# Patient Record
Sex: FEMALE | Race: WHITE | NOT HISPANIC OR LATINO | Employment: OTHER | URBAN - METROPOLITAN AREA
[De-identification: names, ages, dates, MRNs, and addresses within clinical notes are randomized per-mention and may not be internally consistent; named-entity substitution may affect disease eponyms.]

---

## 2021-05-04 NOTE — PROGRESS NOTES
New Patient    Patient: Shona Elizabeth  : 1980    Referring Physician:Oscar Schultz M.D.   No primary care provider on file.    CC: possible seizures      IMPRESSION:  1. Long standing history of episodes of behavioral arrest, with hands automatism, preceded by aura as fear, difficulty with speech.   Semiology is suggestive of focal seizure although association with non-epileptic events cannot be ruled-out (especialy lack of response to CBZ)  No previous seizure workup     2. History of bipolar disorder,  learning disability, auditory processing disorder     PLAN:    1. Spells of decreased attentiveness  - REFERRAL TO NEURODIAGNOSTICS (EEG,EP,EMG/NCS/DBS)  - MR-BRAIN-WITH & W/O; Future    2. Morbid obesity with BMI of 40.0-44.9, adult (HCC)  - MA-SCREENING MAMMO BILAT W/CAD; Future  - Patient identified as having weight management issue.  Appropriate orders and counseling given.    Other orders  - busPIRone (BUSPAR) 10 MG Tab tablet; 2 times a day.  - albuterol 108 (90 Base) MCG/ACT Aero Soln inhalation aerosol; Inhale 2 Puffs every 6 hours as needed for Shortness of Breath.    3. Hold off changing/initiating AED pending #1 given her previous poor reaction to medications, consider EMU in late August to if MRI and EEG normal    4. Advise to reduce triggering factors  5. Seizure precautions were discussed in detail with patient: long discussion regarding driving restriction,   I informed her of the State Law of [place] which requires that patients with an episode involving a condition with lapse of consciousness, blackout, seizure, fainting spell, syncope, or other impairment of the level of consciousness cannot drive for at least 3 months from the last episode.  She does have reliable aura that gives her enough time to pull over the car. She was encouraged to report this to the DMV and to be cleared by DMV.  6. Return in 6 months or sooner if needed.       HISTORY:  I had the opportunity to see Ms.   Shona Elizabeth in the epilepsy clinic on 2021 for seizure disorder. she was accompanied by her mother who provided additional history. This is her initial visit. she is referred by Dr. Oscar Schultz for possible seizure disorder.      Dominant hand: ambidextrous    Outside records and ED record reviewed: Yes.    In brief, her pertinent medical history is remarkable for long standing history of events of unclear etiology    Event type 1 - freezing episodes  The onset of event was mid-late 30s, the ictal behavior was stereotyped and described as  Extreme fear, trouble to get words out (can be hour in duration) behavioral arrest, hands automatism, unresponsiveness and cannot move.  No nausea but rare drooling.   Headache and sleepy sometime. Sometime she is fully aware but cannot speak and at times she is not aware. Duration a few minutes, frequency: from 2-3 per week to none in a few weeks. The last one was 1 week ago.    Event type 2- she has flush light on the right side, smoke in the air. Auditory hallucination. Infrequent, no LOC     Event type 3- she was packing suitcase, her suitcase became very small suddenly. Infrequent     The events  were isolated    Special features:  They were noted only during the wake but more at evening/ night (when she is awake), there was no specific timing.     Potential triggering factors were reviewed   Sleep deprivation   Alcohol   Stress   Periods   Other    Epilepsy risk factors:     -Positive: learning disability, auditory processing disorder   -Negative: Normal prenatal and  course. Normal development physically and intellectually. No febrile convulsions. No history of severe head trauma. No meningitis. No stroke. No alcohol abuse. No significant exposure to recreational drugs. There is no family history of epilepsy, or spells. She graduated from university.     Current antiepileptic medicines: none       Previous workup:    1. EEG data: n/a    2. Neuroimaging data:  N/a      3. Recent AED level:    Prior antiepileptic medications were reviewed(she was diagnosed with bipolar disorder in the past)  CBZ weight gain, nausea and rash (was on 2017, she did not notice any improvement)   LTG memory lapse, rash, clumsiness, hallucination (from 2012 to 2014)      Antiepileptic medications most useful:    Other therapeutic interventions:   Vagus nerve stimulation   Diet   Surgery for epilepsy   Other    Current Outpatient Medications   Medication Sig Dispense Refill   • busPIRone (BUSPAR) 10 MG Tab tablet 2 times a day.     • albuterol 108 (90 Base) MCG/ACT Aero Soln inhalation aerosol Inhale 2 Puffs every 6 hours as needed for Shortness of Breath.       No current facility-administered medications for this visit.        Allergies   Allergen Reactions   • Aspirin    • Coffea Arabica        Past Medical History:   Diagnosis Date   • Anxiety    • Asthma        Social History     Socioeconomic History   • Marital status: Not on file     Spouse name: Not on file   • Number of children: Not on file   • Years of education: Not on file   • Highest education level: Not on file   Occupational History   • Not on file   Tobacco Use   • Smoking status: Never Smoker   • Smokeless tobacco: Never Used   Substance and Sexual Activity   • Alcohol use: Not Currently   • Drug use: Never   • Sexual activity: Not on file   Other Topics Concern   • Not on file   Social History Narrative   • Not on file     Social Determinants of Health     Financial Resource Strain:    • Difficulty of Paying Living Expenses:    Food Insecurity:    • Worried About Running Out of Food in the Last Year:    • Ran Out of Food in the Last Year:    Transportation Needs:    • Lack of Transportation (Medical):    • Lack of Transportation (Non-Medical):    Physical Activity:    • Days of Exercise per Week:    • Minutes of Exercise per Session:    Stress:    • Feeling of Stress :    Social Connections:    • Frequency of Communication  "with Friends and Family:    • Frequency of Social Gatherings with Friends and Family:    • Attends [employer] Services:    • Active Member of Clubs or Organizations:    • Attends Club or Organization Meetings:    • Marital Status:    Intimate Partner Violence:    • Fear of Current or Ex-Partner:    • Emotionally Abused:    • Physically Abused:    • Sexually Abused:        No family history on file.      PHYSICAL EXAM:    /75 (BP Location: Left arm, Patient Position: Sitting, BP Cuff Size: Adult)   Pulse 100   Temp 36.7 °C (98 °F) (Temporal)   Resp 16   Ht 1.676 m (5' 6\")   Wt 118 kg (261 lb)   SpO2 98%   BMI 42.13 kg/m²     On examination,  She appears her stated age. She has a normal, reactive affect. No apparent distress,  alert and appropriate. No labored breathing.  There is no peripheral edema. Skin: no rash or abnormalities    She gives a precise account of  her clinical symptoms. She has fluent conversational speech, without error.    Visual fields are full to confrontation.  Pupils are equal, round, and reactive to light.  Extraocular movements are conjugate, full, and without nystagmus Her face is symmetric.     There is normal muscle bulk and tone in all four extremities.  No drift, or satelliting of the upper extremities.  Normal amplitude rapid alternating movements using the digits of either hand.  I see no tremor.     Gait is normal.         The total visit duration was of 80 minutes of which more than 50% was spent in coordination of care and counseling.         Carlos Camacho MD  Diplomate, American Board of Psychiatry and Neurology   Diplomate, American Board of Psychiatry and Neurology in Epilepsy        "

## 2021-05-07 ENCOUNTER — OFFICE VISIT (OUTPATIENT)
Dept: NEUROLOGY | Facility: MEDICAL CENTER | Age: 41
End: 2021-05-07
Attending: PSYCHIATRY & NEUROLOGY
Payer: MEDICAID

## 2021-05-07 VITALS
BODY MASS INDEX: 41.95 KG/M2 | TEMPERATURE: 98 F | SYSTOLIC BLOOD PRESSURE: 112 MMHG | WEIGHT: 261 LBS | HEART RATE: 100 BPM | OXYGEN SATURATION: 98 % | RESPIRATION RATE: 16 BRPM | DIASTOLIC BLOOD PRESSURE: 75 MMHG | HEIGHT: 66 IN

## 2021-05-07 DIAGNOSIS — E66.01 MORBID OBESITY WITH BMI OF 40.0-44.9, ADULT (HCC): Primary | ICD-10-CM

## 2021-05-07 DIAGNOSIS — R68.89 SPELLS OF DECREASED ATTENTIVENESS: ICD-10-CM

## 2021-05-07 PROBLEM — R46.89 SPELL OF BEHAVIOR CHANGE: Status: ACTIVE | Noted: 2021-05-07

## 2021-05-07 PROCEDURE — 99211 OFF/OP EST MAY X REQ PHY/QHP: CPT | Performed by: PSYCHIATRY & NEUROLOGY

## 2021-05-07 PROCEDURE — 99205 OFFICE O/P NEW HI 60 MIN: CPT | Performed by: PSYCHIATRY & NEUROLOGY

## 2021-05-07 RX ORDER — ALBUTEROL SULFATE 90 UG/1
2 AEROSOL, METERED RESPIRATORY (INHALATION) EVERY 6 HOURS PRN
COMMUNITY

## 2021-05-07 RX ORDER — BUSPIRONE HYDROCHLORIDE 10 MG/1
TABLET ORAL 2 TIMES DAILY
COMMUNITY
Start: 2021-04-12

## 2021-05-07 ASSESSMENT — PATIENT HEALTH QUESTIONNAIRE - PHQ9: CLINICAL INTERPRETATION OF PHQ2 SCORE: 0

## 2021-05-07 NOTE — PATIENT INSTRUCTIONS
Seizure precautions    Driving    Every State restricts driving in people with seizures. [place] requires that you be seizure free for 3 months from the date of your last seizure. The Department of Motor Vehicles (DMV), not the doctor, makes the decision on driving. Exceptions may be made for seizures that do not affect mental condition and ability to control a car, or that occur 100% during sleep.We recommend:  § Do not drive if you are having seizures that would be dangerous on the road.  § Be honest with your doctor about your seizures, even if driving may be at risk  § Be honest with the DMV (use the driving form). It may protect you legally if problems later occur.    Seizure medicines and suicide    Seizure medicines have long been known to help some people with depression, but also to make others worse. The FDA recently took a look at their database of clinical studies of people taking epilepsy medicines. Their finding was 4 suicides in 27,863 patients taking epilepsy medicines, versus none in patients taking placebo (an inactive pill). They reported 105 people of the 27,863 who did not commit suicide, but had thoughts of suicide. Combined, the risk for suicidal thoughts and behavior was about 0.4% (1 in 250) for those taking epilepsy medications and 0.2% (1 in 500) for those given placebos.    This is new and important information because doctors and patients need to know the possible side effects of medicines. But it needs to be put in perspective and certainly is no reason for panic. The 4 suicides in 27,863 is a very small percentage, and it is impossible to be sure the epilepsy drugs were the cause. Depression occurs in about 10% or more of people with epilepsy, even independent of medications. We recommend the following.  § Do not stop your seizure medicine. It could be dangerous.  § If you have symptoms of depression, such as crying and low mood, please discuss them at your clinic visits, so a decision  can be made about whether antidepressant medication or referral to a psychotherapist would be useful.  § If you are thinking seriously about suicide, please call 911.  § For most people, this FDA warning is just something to know, but not a reason to change medicines.    Bone health    Several of the older antiseizure medications may cause thinning of bones with long-term use, leading to broken bones later in life. This is most problematic with phenytoin (Dilantin), but may occur with carbamazepine (Tegretol, Carbatrol), phenobarbital, primidone (Mysoline) and possibly valproate/valproic acid (Depakote, Depakene). This is a larger concern for women in mid-life or older, but it also can be an issue for men and younger women.   § This potential problem is not an emergency and occurs over months to years; do not stop your seizure medication without discussing your concerns with your doctor.   § Calcium, vitamin D3 supplementation and regular exercise may be helpful to maintain bone health.  § Periodic bone density screening may be helpful to show existence or progression of bone thinning.     Water Safety    You could drown during a seizure that occurs in water. Use the [employer] system for swimming. Let the sona know that you have seizures. Take showers instead of baths.    Burn safety    If you have uncontrolled seizures, be very careful around heat or flames. Cook on the back burner - you are less likely to lean on the burner or turn over the soup during a seizure. Don’t smoke, which is good advice for other reasons as well. Set the maximum house hot water temperature to 110 degrees Fahrenheit. Put guards on open fireplaces, wood stoves or radiators.     Heights    Occasional use of ladders and going up and down stairs is a reasonable risk. If your seizures are not in control, then do not work on ladders or unprotected heights for more than brief minutes.    Equipment and Power Tools    Cutting, chopping and drilling  equipment should have safety guards to avoid inadvertent injury; otherwise, do not use it if your seizures are not fully controlled. Do not use mowers lacking automatic stop switches or chain saws.     Safety    If you have uncontrolled seizures, do not carry your child in your arms, but use one of the slings/papooses. Change the baby on the floor. Do not bathe the baby in water deep enough for the mouth to be underwater. Breastfeeding is usually considered beneficial, even though small amounts of seizure medicines come out in the breast milk.    Fall Precautions      If you fall with some of your seizures, then fall-proof your environment. Put in carpets, cover sharp corners, and consider wearing a protective helmet in some circumstances.    Sudden Unexplained Death in Epilepsy (SUDEP)    It is rare for people to die from a seizure, but it can happen. One way is trauma or a car crash from a seizure. Another is the poorly understood condition called sudden unexplained death in epilepsy (SUDEP). We think this is most likely due to heart arrhythmias (irregular beats) caused by a seizure, but the mechanism is debated. For people with uncontrolled seizures we recommend:  § Do not suddenly stop your seizure medication, since this can be a risk factor for SUDEP.  § Do not be overly worried about SUDEP. It is tragic when it happens, but it is uncommon and there are currently no preventive measures, other than the best possible seizure control.    Medication Side Effects    To be approved for prescription use, seizure medicines must pass strict safety testing. Nevertheless, they all have side effects, some of which are potentially serious or even lethal. The risks of medications must be balanced against the risks of seizures. A full discussion of possible medicine side effects is not possible here, but we recommend:  § Know the main side effects of your seizure medicines. Your doctor is the best source for  individual information. Web sites such as epilepsyfoundation.org and epilepsy.com have good information.  § The package insert provided with your prescription lists full information on side effects, but most of these will never occur in an individual. Let the package insert inform you, but not scare you. Be aware that some side effects occur from drug interactions among all your medications. Interactions can involve prescription medicines, over-the-counter medicines, herbal remedies and even some foods. Grapefruit juice is an example of a seemingly benign food that can raise levels of carbamazepine or other drugs.  § Generic medications are less expensive, but may not produce the same blood levels as do brand name drugs or even other generics. Insurance plans and pharmacies sometimes switch to generics without patient or doctor approval. Be cautious if you are switching or being switched to generics. It may work out fine (and it often is a lot less expensive), but a blood test to check levels might be useful.    Recreation    If having a seizure during a recreational activity would cause you significant harm, then do not do the activity. Use common sense. Confer with your medical team for individual restrictions. As a general guideline for starting discussion with your medical team, we recommend:  § Low-risk recreation can be done by people with seizures, even if not in control. These include walking, running, bowling, golf, baseball, basketball, soccer, volleyball, swimming with the [employer] system, weight training with machines or spotters, elliptical trainers, treadmills with spotters. Confirm this with your medical care team.  § You should be able to go at least 3 months without a seizure to participate in medium-risk activities, but confirm this interval with your doctor. These include football, hockey, ice skating, bike racing, gymnastics, horseback riding and boating.  § You should be seizure free for more than  a year to perform high-risk activities, although some doctors recommend not engaging in high-risk recreational activities at all with a history of epilepsy. Ask yours if it is safe to engage in high-risk recreation. High-risk activities include hang gliding, motor sports, skiing, competitive skateboarding, mountain or rock climbing and SCUBA diving.

## 2021-07-14 ENCOUNTER — HOSPITAL ENCOUNTER (OUTPATIENT)
Dept: RADIOLOGY | Facility: MEDICAL CENTER | Age: 41
End: 2021-07-14
Attending: PSYCHIATRY & NEUROLOGY
Payer: MEDICAID

## 2021-07-14 ENCOUNTER — NON-PROVIDER VISIT (OUTPATIENT)
Dept: NEUROLOGY | Facility: MEDICAL CENTER | Age: 41
End: 2021-07-14
Attending: PSYCHIATRY & NEUROLOGY
Payer: MEDICAID

## 2021-07-14 DIAGNOSIS — R68.89 SPELLS OF DECREASED ATTENTIVENESS: ICD-10-CM

## 2021-07-14 PROCEDURE — 95816 EEG AWAKE AND DROWSY: CPT | Mod: 26 | Performed by: PSYCHIATRY & NEUROLOGY

## 2021-07-14 PROCEDURE — 95816 EEG AWAKE AND DROWSY: CPT | Performed by: PSYCHIATRY & NEUROLOGY

## 2021-07-14 PROCEDURE — A9576 INJ PROHANCE MULTIPACK: HCPCS | Performed by: PSYCHIATRY & NEUROLOGY

## 2021-07-14 PROCEDURE — 70553 MRI BRAIN STEM W/O & W/DYE: CPT

## 2021-07-14 PROCEDURE — 700117 HCHG RX CONTRAST REV CODE 255: Performed by: PSYCHIATRY & NEUROLOGY

## 2021-07-14 RX ADMIN — GADOTERIDOL 20 ML: 279.3 INJECTION, SOLUTION INTRAVENOUS at 08:30

## 2021-07-14 NOTE — PROCEDURES
VIDEO ELECTROENCEPHALOGRAM REPORT      Referring provider: Dr. Camacho    DOS: 07/14/21 (total recording of 27 minutes).     INDICATION:  Shona Elizabeth 41 y.o. adult presenting with episodes of behavioral arrest.     CURRENT ANTIEPILEPTIC REGIMEN: none     TECHNIQUE: 30 channel video electroencephalogram (EEG) was performed in accordance with the international 10-20 system. The study was reviewed in bipolar and referential montages. The recording examined the patient during   awake and drowsy states    DESCRIPTION OF THE RECORD:  During the wakefulness, the background showed a symmetrical 11Hz alpha activity posteriorly with amplitude of 70 mV.  There was reactivity to eye closure/opening.  A normal anterior-posterior gradient was noted with faster beta frequencies seen anteriorly.  During drowsiness, theta/delta frequencies were seen.  No sleep attained.     ACTIVATION PROCEDURES:     Hyperventilation was performed by the patient for a total of 3 minutes. The technician performing the test noted good effort. No physiological build up seen.     Intermittent Photic stimulation was performed in a stepwise fashion from 1 to 30 Hz and elicited a normal response (photic driving), most noticeable in the posterior leads.    ICTAL AND/OR INTERICTAL FINDINGS:   No focal or generalized epileptiform activity noted. No regional slowing was seen during this routine study.  No clinical events or seizures were reported or recorded during the study.     EKG: sampling of the EKG recording demonstrated sinus rhythm.     EVENTS:none     INTERPRETATION:    This is a  normal video EEG recording in the awake and drowsy states.   Note: A normal EEG does not rule out epilepsy.  If the clinical suspicion remains high for seizures, a prolonged recording to capture clinical or subclinical events may be helpful.    Carlos Camacho MD  Diplomate in Neurology&Epilepsy  Office: [phone]  Fax: [phone]

## 2021-07-20 ENCOUNTER — APPOINTMENT (OUTPATIENT)
Dept: RADIOLOGY | Facility: MEDICAL CENTER | Age: 41
End: 2021-07-20
Attending: EMERGENCY MEDICINE
Payer: MEDICAID

## 2021-07-20 ENCOUNTER — HOSPITAL ENCOUNTER (EMERGENCY)
Facility: MEDICAL CENTER | Age: 41
End: 2021-07-20
Attending: EMERGENCY MEDICINE
Payer: MEDICAID

## 2021-07-20 VITALS
RESPIRATION RATE: 18 BRPM | WEIGHT: 250 LBS | HEIGHT: 66 IN | TEMPERATURE: 98 F | DIASTOLIC BLOOD PRESSURE: 78 MMHG | BODY MASS INDEX: 40.18 KG/M2 | HEART RATE: 81 BPM | SYSTOLIC BLOOD PRESSURE: 140 MMHG | OXYGEN SATURATION: 96 %

## 2021-07-20 DIAGNOSIS — F41.0 PANIC ATTACK: ICD-10-CM

## 2021-07-20 DIAGNOSIS — R07.89 OTHER CHEST PAIN: ICD-10-CM

## 2021-07-20 LAB — EKG IMPRESSION: NORMAL

## 2021-07-20 PROCEDURE — 93005 ELECTROCARDIOGRAM TRACING: CPT | Performed by: EMERGENCY MEDICINE

## 2021-07-20 PROCEDURE — 71045 X-RAY EXAM CHEST 1 VIEW: CPT

## 2021-07-20 PROCEDURE — 700102 HCHG RX REV CODE 250 W/ 637 OVERRIDE(OP): Performed by: EMERGENCY MEDICINE

## 2021-07-20 PROCEDURE — A9270 NON-COVERED ITEM OR SERVICE: HCPCS | Performed by: EMERGENCY MEDICINE

## 2021-07-20 PROCEDURE — 99285 EMERGENCY DEPT VISIT HI MDM: CPT

## 2021-07-20 RX ORDER — DIAZEPAM 5 MG/1
10 TABLET ORAL EVERY 6 HOURS PRN
Status: DISCONTINUED | OUTPATIENT
Start: 2021-07-20 | End: 2021-07-21 | Stop reason: HOSPADM

## 2021-07-20 RX ADMIN — DIAZEPAM 10 MG: 5 TABLET ORAL at 21:06

## 2021-07-21 ENCOUNTER — OFFICE VISIT (OUTPATIENT)
Dept: NEUROLOGY | Facility: MEDICAL CENTER | Age: 41
End: 2021-07-21
Attending: PSYCHIATRY & NEUROLOGY
Payer: MEDICAID

## 2021-07-21 VITALS
BODY MASS INDEX: 42.52 KG/M2 | HEART RATE: 75 BPM | WEIGHT: 264.55 LBS | OXYGEN SATURATION: 99 % | TEMPERATURE: 98.7 F | SYSTOLIC BLOOD PRESSURE: 118 MMHG | HEIGHT: 66 IN | DIASTOLIC BLOOD PRESSURE: 78 MMHG | RESPIRATION RATE: 20 BRPM

## 2021-07-21 DIAGNOSIS — R68.89 SPELLS OF DECREASED ATTENTIVENESS: Primary | ICD-10-CM

## 2021-07-21 PROCEDURE — 99214 OFFICE O/P EST MOD 30 MIN: CPT | Performed by: PSYCHIATRY & NEUROLOGY

## 2021-07-21 PROCEDURE — 99211 OFF/OP EST MAY X REQ PHY/QHP: CPT | Performed by: PSYCHIATRY & NEUROLOGY

## 2021-07-21 NOTE — DISCHARGE INSTRUCTIONS
Return for symptoms different from your typical panic attacks such as significant chest pain, significant shortness of breath or other unusual symptoms.  Follow-up with your doctor as needed.    You had a borderline or high normal blood pressure reading today.  This does not necessarily mean you have hypertension.  Please followup with your/a primary physician for comprehensive blood pressure evaluation and yearly fasting cholesterol assessment.  BP Readings from Last 3 Encounters:   07/20/21 (!) 168/102   05/07/21 112/75

## 2021-07-21 NOTE — ED PROVIDER NOTES
"ED Provider Note    CHIEF COMPLAINT  Chief Complaint   Patient presents with   • Panic Attack     Pt was a code assist upstairs. Per rapid response team report, pt became tachypneic and anxious. Pt was \"staring into space\". Pt reports chest pain.    • Chest Pain       HPI  Shona Elizabeth is a 41 y.o. 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  Her mother had a TAVR yesterday and may have been a stroke today and a rapid response team presented.  According to the sister this patient zoned out, had some chest tightness and felt dizzy.  This was like panic attacks that patient has had but more severe.  She says she infrequently has panic attacks but has had four this week according to the sister.  Denies history of coronary artery disease.  She saw a neurologist this week and had a normal MRI of the brain with and without contrast to evaluate these \"zoning out\" episodes.  She has also had an echo and a Holter monitor recently within normal limits.  Denies history of hypertension dyslipidemia diabetes or tobacco use.  There is a family history of coronary artery disease    REVIEW OF SYSTEMS  Pertinent positives include: Panic attack, decreased responsiveness, dizziness, forgetfulness.  Pertinent negatives include:shortness of breath.  10+ systems reviewed and negative.      PAST MEDICAL HISTORY  Past Medical History:   Diagnosis Date   • Anxiety    • Asthma        SOCIAL HISTORY  Social History     Tobacco Use   • Smoking status: Never Smoker   • Smokeless tobacco: Never Used   Vaping Use   • Vaping Use: Never used   Substance Use Topics   • Alcohol use: Not Currently   • Drug use: Never     Social History     Substance and Sexual Activity   Drug Use Never       SURGICAL HISTORY  Past Surgical History:   Procedure Laterality Date   • ABDOMINAL EXPLORATION         CURRENT MEDICATIONS  Current Facility-Administered Medications   Medication Dose Route Frequency Provider Last Rate " "Last Admin   • diazePAM (VALIUM) tablet 10 mg  10 mg Oral Q6HRS PRN Zelalem Medeiros M.D.         Current Outpatient Medications   Medication Sig Dispense Refill   • busPIRone (BUSPAR) 10 MG Tab tablet 2 times a day.     • albuterol 108 (90 Base) MCG/ACT Aero Soln inhalation aerosol Inhale 2 Puffs every 6 hours as needed for Shortness of Breath.         ALLERGIES  Allergies   Allergen Reactions   • Aspirin    • Coconut Flavor    • Coffea Arabica        PHYSICAL EXAM  VITAL SIGNS: BP (!) 168/102   Pulse 80   Temp 36.3 °C (97.4 °F) (Oral)   Resp (!) 21   Ht 1.676 m (5' 6\")   Wt 113 kg (250 lb)   SpO2 98%   BMI 40.35 kg/m²   Reviewed and hypertensive afebrile  Constitutional: Well developed, Well nourished elevated BMI.  HENT: Normocephalic, atraumatic, bilateral external ears normal, Wearing mask.   Eyes: PERRLA, conjunctiva pink, no scleral icterus.   Cardiovascular: Regular S1-S2 without murmur, rub, gallop.  No dependent edema or calf tenderness.  Respiratory: No rales, rhonchi, wheeze or cough.  Gastrointestinal: Soft, nontender, nondistended, no organomegaly.  Skin: No erythema, no rash.   Genitourinary:  No costovertebral angle tenderness.   Neurologic: Alert & oriented x 3, cranial nerves 2-12 intact by passive exam.  No focal deficit noted.  Psychiatric: Affect normal, Judgment normal, Mood normal.     DIFFERENTIAL DIAGNOSIS:  ***.    EKG  ***.    RADIOLOGY/PROCEDURES  DX-CHEST-PORTABLE (1 VIEW)   Final Result         1.  No acute cardiopulmonary disease.          LABORATORY:  No results found for this or any previous visit.    INTERVENTIONS:  Medications   diazePAM (VALIUM) tablet 10 mg (has no administration in time range)     Response:***.    COURSE & MEDICAL DECISION MAKING  ***.    This patient has borderline or elevated blood pressure as recorded above and was instructed to followup with primary physician for comprehensive blood pressure evaluation and yearly fasting cholesterol assessment according " to to CMS protocol.    PLAN:  New Prescriptions    No medications on file       ***  *** handout given  Return for ***    No follow-up provider specified.    CONDITION: ***.    FINAL IMPRESSION  No diagnosis found.      Electronically signed by: Zelalem Medeiros M.D., 7/20/2021 8:47 PM

## 2021-07-21 NOTE — ED TRIAGE NOTES
"Chief Complaint   Patient presents with   • Panic Attack     Pt was a code assist upstairs. Per rapid response team report, pt became tachypneic and anxious. Pt was \"staring into space\". Pt reports chest pain.      Pt to yellow 66 by WC. Pt in gown and hooked up to monitor. Sister at bedside. Chart up for ERP.  "

## 2021-07-21 NOTE — PROGRESS NOTES
Follow-up visit    Patient: Shona Elizabeth  : 1980    Referring Physician: Carlos Camacho M.D.   Oscar Schultz M.D.    CC: seizures    IMPRESSION:  1. Long standing history of episodes of behavioral arrest, with hands automatism, preceded by aura as fear, difficulty with speech.   Semiology is suggestive of focal seizure although association with non-epileptic events cannot be ruled-out (especialy lack of response to CBZ)  seizure workup negative so far      2. History of bipolar disorder,  learning disability, auditory processing disorder     3. Panic attack      PLAN:  1. Spells of decreased attentiveness  - REFERRAL TO NEURODIAGNOSTICS (EEG,EP,EMG/NCS/DBS)  EMU admission in  to further assess risk for seizure and to characterize typical events.   2. advise to reduce triggering factors, continue to follow up with psychologist and psychiatrist.   3. Hold off changing/initiating AED pending #1 given her previous poor reaction to medications.   4. Advise to reduce triggering factors  5. Seizure precautions were discussed in detail with patient: discussion regarding driving restriction if she has recurrent episodes of LOC  6. Return in 6 months or sooner if needed.         HISTORY:    I had the opportunity to see   Shona Elizabeth in the epilepsy clinic on 2021 for follow up on seizure disorder. Aubrie Elizabeth was accompanied by her sister who provided additional history Aubrie Elizabeth was last seen by myself 2021.     Dominant hand: ambidextrous    Outside records and ED record reviewed: Yes.     In brief, her pertinent medical history is remarkable for long standing history of events of unclear etiology     Event type 1 - freezing episodes  The onset of event was mid-late 30s, the ictal behavior was stereotyped and described as  Extreme fear, trouble to get words out (can be hour in duration) behavioral arrest, hands automatism, unresponsiveness and cannot move.  No nausea but rare  "drooling.   Headache and sleepy sometime. Sometime she is fully aware but cannot speak and at times she is not aware. Duration a few minutes, frequency: from 2-3 per week to none in a few weeks. The last one was 1 week ago.     Event type 2- she has flush light on the right side, smoke in the air. Auditory hallucination. Infrequent, no LOC      Event type 3- she was packing suitcase, her suitcase became very small suddenly. Infrequent      The events  were isolated     Special features:  They were noted only during the wake but more at evening/ night (when she is awake), there was no specific timing.      Potential triggering factors were reviewed              Sleep deprivation              Alcohol              Stress              Periods              Other     Epilepsy risk factors:      -Positive: learning disability, auditory processing disorder   -Negative: Normal prenatal and  course. Normal development physically and intellectually. No febrile convulsions. No history of severe head trauma. No meningitis. No stroke. No alcohol abuse. No significant exposure to recreational drugs. There is no family history of epilepsy, or spells. She graduated from university.        Interval history:     Since last visit, she has no recurrent \" freezing episode\" for the past a few months. Unfortunately, she has been having more severe panic attacks that were witnessed by her sister.  Panic attack like episodes since childhood, those are more severe now due to their mother's illness.   Last one yesterday and all had triggers that she was upset by something, she became anxious with heavy breathing, trouble breathing with slurred speech and stuttering, les vu feeling. sensitive to light and sounds.   She was coherent during the panic attack but does not have recollection   45 minutes to 1 hour duration   Loud sounds overwhlems her.     Her MRI and EEG were both normal.     Current AEDs:  None       Previous " workup:    1. EEG data: EEG normal     2. Neuroimaging data: MRI brain normal     3. Recent AED level:      Prior antiepileptic medications were reviewed(she was diagnosed with bipolar disorder in the past)  CBZ weight gain, nausea and rash (was on 2017, she did not notice any improvement)   LTG memory lapse, rash, clumsiness, hallucination (from 2012 to 2014)       Antiepileptic medications most useful:    Other therapeutic interventions:   Vagus nerve stimulation   Diet   Surgery for epilepsy   Other    I reviewed the previous medical history, surgical, family and social histories in the electronic medical record.   On review of systems, 10 of 14 systems are reviewed and found to be negative except as listed above.     Current Outpatient Medications   Medication Sig Dispense Refill   • busPIRone (BUSPAR) 10 MG Tab tablet 2 times a day.     • albuterol 108 (90 Base) MCG/ACT Aero Soln inhalation aerosol Inhale 2 Puffs every 6 hours as needed for Shortness of Breath.       No current facility-administered medications for this visit.        Allergies   Allergen Reactions   • Aspirin    • Coconut Flavor    • Coffea Arabica        Past Medical History:   Diagnosis Date   • Anxiety    • Asthma        Social History     Socioeconomic History   • Marital status: Single     Spouse name: Not on file   • Number of children: Not on file   • Years of education: Not on file   • Highest education level: Not on file   Occupational History   • Not on file   Tobacco Use   • Smoking status: Never Smoker   • Smokeless tobacco: Never Used   Vaping Use   • Vaping Use: Never used   Substance and Sexual Activity   • Alcohol use: Not Currently   • Drug use: Never   • Sexual activity: Not on file   Other Topics Concern   • Not on file   Social History Narrative   • Not on file     Social Determinants of Health     Financial Resource Strain:    • Difficulty of Paying Living Expenses:    Food Insecurity:    • Worried About Running Out of Food  "in the Last Year:    • Ran Out of Food in the Last Year:    Transportation Needs:    • Lack of Transportation (Medical):    • Lack of Transportation (Non-Medical):    Physical Activity:    • Days of Exercise per Week:    • Minutes of Exercise per Session:    Stress:    • Feeling of Stress :    Social Connections:    • Frequency of Communication with Friends and Family:    • Frequency of Social Gatherings with Friends and Family:    • Attends Holiness Services:    • Active Member of Clubs or Organizations:    • Attends Club or Organization Meetings:    • Marital Status:    Intimate Partner Violence:    • Fear of Current or Ex-Partner:    • Emotionally Abused:    • Physically Abused:    • Sexually Abused:        No family history on file.      PHYSICAL EXAM:      /78 (BP Location: Left arm, Patient Position: Sitting, BP Cuff Size: Adult)   Pulse 75   Temp 37.1 °C (98.7 °F) (Temporal)   Resp 20   Ht 1.676 m (5' 5.98\")   Wt 120 kg (264 lb 8.8 oz)   SpO2 99%   BMI 42.72 kg/m²     On examination,  She has a normal, reactive affect.No apparent distress,  alert and appropriate. No labored breathing.   There is no peripheral edema. Skin: no rash or abnormalities    She gives a precise account of clinical symptoms. She has fluent conversational speech, without error. No dysarthria.  facial movements intact. No UE drift. I see no tremor.    Gait is cher      The total visit duration was of 30 minutes of which more than 50% was spent in coordination of care and counseling.         Carlos Camacho MD  Diplomate, American Board of Psychiatry and Neurology   Diplomate, American Board of Psychiatry and Neurology in Epilepsy     "

## 2021-07-21 NOTE — ED NOTES
Discharge instructions discussed with pt, verbalizes understanding. Pt amb to lobby with steady gait. All belongings with pt upon leaving unit.

## 2021-07-21 NOTE — ED PROVIDER NOTES
"ED Provider Note    CHIEF COMPLAINT  Chief Complaint   Patient presents with   • Panic Attack     Pt was a code assist upstairs. Per rapid response team report, pt became tachypneic and anxious. Pt was \"staring into space\". Pt reports chest pain.    • Chest Pain       HPI  Shona Elizabeth is a 41 y.o. 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  Her mother had a TAVR yesterday and may have been a stroke today and a rapid response team presented.  According to the sister this patient zoned out, had some chest tightness felt dizzy.  This was like panic attacks that patient has had but more severe.  She says she infrequently has panic attacks but has had for this week according to the sister.  Denies history of coronary artery disease.  She saw a neurologist this week and had a normal MRI of the brain with and without contrast to evaluate these zoning out episodes.  She is also had an echo and a Holter monitor recently within normal.  Denies history of hypertension dyslipidemia diabetes or tobacco use.  There is a family history of coronary artery disease    REVIEW OF SYSTEMS  Pertinent positives include: Anxiety, panic attack, nonverbal, forgetful, chest discomfort.  Pertinent negatives include: Coronary artery disease, ongoing chest pain, leg swelling, calf pain, DVT or PE history.  10+ systems reviewed and negative.      PAST MEDICAL HISTORY  Past Medical History:   Diagnosis Date   • Anxiety    • Asthma        SOCIAL HISTORY  Social History     Tobacco Use   • Smoking status: Never Smoker   • Smokeless tobacco: Never Used   Vaping Use   • Vaping Use: Never used   Substance Use Topics   • Alcohol use: Not Currently   • Drug use: Never     Social History     Substance and Sexual Activity   Drug Use Never       SURGICAL HISTORY  Past Surgical History:   Procedure Laterality Date   • ABDOMINAL EXPLORATION         CURRENT MEDICATIONS    Current Outpatient Medications   Medication " "Sig Dispense Refill   • busPIRone (BUSPAR) 10 MG Tab tablet 2 times a day.     • albuterol 108 (90 Base) MCG/ACT Aero Soln inhalation aerosol Inhale 2 Puffs every 6 hours as needed for Shortness of Breath.         ALLERGIES  Allergies   Allergen Reactions   • Aspirin    • Coconut Flavor    • Coffea Arabica        PHYSICAL EXAM  VITAL SIGNS: BP (!) 168/102   Pulse 80   Temp 36.3 °C (97.4 °F) (Oral)   Resp (!) 21   Ht 1.676 m (5' 6\")   Wt 113 kg (250 lb)   SpO2 98%   BMI 40.35 kg/m²   Reviewed and hypertensive, afebrile, tachypneic, no hypoxia room air  Constitutional: Well developed, Well nourished, elevated BMI.  HENT: Normocephalic, atraumatic, bilateral external ears normal, Wearing mask.   Eyes: PERRLA, conjunctiva pink, no scleral icterus.   Cardiovascular: Regular S1-S2 without murmur, rub, gallop.  No dependent edema or calf tenderness.  Respiratory: No rales, rhonchi, wheeze or cough.  Gastrointestinal: Soft, nontender, nondistended, no organomegaly.  Skin: No erythema, no rash.   Genitourinary:  No costovertebral angle tenderness.   Neurologic: Alert & oriented x 3, cranial nerves 2-12 intact by passive exam.  No focal deficit noted.  Psychiatric: Subdued, not hyperventilating does not appear anxious currently, no depressed affect.     DIFFERENTIAL DIAGNOSIS:  Panic attack, chronic anxiety, doubt seizure, doubt myocardial ischemia, doubt PE    EKG  EKG Interpretation 5:18 PM    Interpreted by me.  Indication: Chest pain    Rhythm: normal sinus   Rate: Normal at 77  Axis: normal  Ectopy: none  Conduction: normal  ST/T Waves: no acute change  Q Waves: none  R Wave progression: normal    Clinical Impression: Normal sinus rhythm      RADIOLOGY/PROCEDURES  DX-CHEST-PORTABLE (1 VIEW)   Final Result         1.  No acute cardiopulmonary disease.          LABORATORY:  No results found for this or any previous visit.    INTERVENTIONS:  Medications   diazePAM (VALIUM) tablet 10 mg (has no administration in time " range)     Response: Patient doubts there was a benefit but she had steady improvement in her anxiety anyway    COURSE & MEDICAL DECISION MAKING  This patient with a heart score of 1 presents with an apparent anxiety attack triggered by a stressful situation with her hospitalized mother.  She had chest pain likely related to anxiety.  There is no evidence of myocardial ischemia or symptoms of pulmonary embolism.  There is no pneumonia or pneumothorax.    PLAN:  Reassurance  Panic attack handout  Continue current medications    CONDITION: Stable.    FINAL IMPRESSION  1. Panic attack    2. Other chest pain          Electronically signed by: Zelalem Medeiros M.D., 7/20/2021 8:47 PM